# Patient Record
Sex: MALE | Race: ASIAN | NOT HISPANIC OR LATINO | URBAN - METROPOLITAN AREA
[De-identification: names, ages, dates, MRNs, and addresses within clinical notes are randomized per-mention and may not be internally consistent; named-entity substitution may affect disease eponyms.]

---

## 2021-06-08 ENCOUNTER — EMERGENCY (EMERGENCY)
Facility: HOSPITAL | Age: 26
LOS: 0 days | Discharge: HOME | End: 2021-06-09
Attending: STUDENT IN AN ORGANIZED HEALTH CARE EDUCATION/TRAINING PROGRAM
Payer: COMMERCIAL

## 2021-06-08 VITALS
DIASTOLIC BLOOD PRESSURE: 61 MMHG | WEIGHT: 179.9 LBS | OXYGEN SATURATION: 95 % | HEART RATE: 97 BPM | HEIGHT: 69 IN | RESPIRATION RATE: 16 BRPM | TEMPERATURE: 98 F | SYSTOLIC BLOOD PRESSURE: 101 MMHG

## 2021-06-08 DIAGNOSIS — L50.9 URTICARIA, UNSPECIFIED: ICD-10-CM

## 2021-06-08 DIAGNOSIS — T78.40XA ALLERGY, UNSPECIFIED, INITIAL ENCOUNTER: ICD-10-CM

## 2021-06-08 PROCEDURE — 99284 EMERGENCY DEPT VISIT MOD MDM: CPT

## 2021-06-08 RX ORDER — FAMOTIDINE 10 MG/ML
20 INJECTION INTRAVENOUS ONCE
Refills: 0 | Status: COMPLETED | OUTPATIENT
Start: 2021-06-08 | End: 2021-06-08

## 2021-06-08 RX ORDER — EPINEPHRINE 0.3 MG/.3ML
0.3 INJECTION INTRAMUSCULAR; SUBCUTANEOUS
Qty: 3 | Refills: 0
Start: 2021-06-08 | End: 2021-06-10

## 2021-06-08 RX ORDER — SODIUM CHLORIDE 9 MG/ML
1000 INJECTION INTRAMUSCULAR; INTRAVENOUS; SUBCUTANEOUS ONCE
Refills: 0 | Status: COMPLETED | OUTPATIENT
Start: 2021-06-08 | End: 2021-06-08

## 2021-06-08 RX ORDER — FAMOTIDINE 10 MG/ML
1 INJECTION INTRAVENOUS
Qty: 5 | Refills: 0
Start: 2021-06-08 | End: 2021-06-12

## 2021-06-08 RX ADMIN — Medication 125 MILLIGRAM(S): at 23:32

## 2021-06-08 RX ADMIN — FAMOTIDINE 20 MILLIGRAM(S): 10 INJECTION INTRAVENOUS at 23:32

## 2021-06-08 RX ADMIN — SODIUM CHLORIDE 1000 MILLILITER(S): 9 INJECTION INTRAMUSCULAR; INTRAVENOUS; SUBCUTANEOUS at 23:32

## 2021-06-08 NOTE — ED PROVIDER NOTE - CLINICAL SUMMARY MEDICAL DECISION MAKING FREE TEXT BOX
Pt p/w cutaneous hives/erythema. No evidence of multiorgan involvement. Given history and exam, presentation most consistent with allergic reaction. I have low suspicion for toxic shock syndrome, anaphylaxis, asthma exacerbation, or drug toxicity.  PLAN: Treat w/ IVF, steroids, diphenhydramine prn itching, famotidine.  Rx: Prednisone 40mg qday x3days, Benadryl 25mg q8hr x3 days, EpiPen.  Disposition: Discharge home. Follow up with PCP in 1-2 days. Follow up allergist in 2-5 days.

## 2021-06-08 NOTE — ED ADULT TRIAGE NOTE - CHIEF COMPLAINT QUOTE
BIBA,  allergic reaction to SoyMilk. hives.  denies difficulty breathing/swallowing.  pt took 25mg po benadryl,   EMS gave 50mg benadryl IV.  #20guage L-AC

## 2021-06-08 NOTE — ED PROVIDER NOTE - NSFOLLOWUPCLINICS_GEN_ALL_ED_FT
[facility] Immun[facility]  Allergy/Immunology  [address]  [place] [address]  Phone: [phone]  Fax:     [facility]  Allergy  [address] [place] [id number]  Phone: [phone]  Fax:     [facility]- [facility]  Allergy  [address] [facility] [address]  [place] [address]  Phone: [phone]  Fax: [phone]

## 2021-06-08 NOTE — ED PROVIDER NOTE - NSFOLLOWUPINSTRUCTIONS_ED_ALL_ED_FT
You have been evaluated in the emergency department today for an allergic reaction. You have been given medications to control your symptoms. You have been observed for several hours in the emergency department and you are stable for discharge at this time.     Please take benadryl and pepcid which are available over the counter to help control your symptoms at home.    You have also been given a prescription for steroids, please take them as directed.    Please schedule an appointment with your primary care doctor for follow up in 1-2 days.    Return to the emergency room if you experience rashes, difficulty breathing, difficulty swallowing, lip/mouth/tongue swelling, vomiting, abdominal pain, weakness, confusion, or for any other concerning symptoms.    Thank you for choosing us for your care.

## 2021-06-08 NOTE — ED PROVIDER NOTE - OBJECTIVE STATEMENT
26M no pmhx presenting with hives today after eating soy. No known allergies. Denies any neck pain, sore throat, difficulty breathing, chest pain, shortness of breath, coughs, abdominal pain, nausea/vomiting, subjective neurological deficits, weakness, syncope.

## 2021-06-08 NOTE — ED PROVIDER NOTE - PROGRESS NOTE DETAILS
no stridor, no respiratory distress, no chest pain, no lip or tongue swelling. (+) hives improved.    I have discussed with the patient about the ED workup, lab results, diagnostics results, plan for discharge home, need for follow-up with primary care physician/specialists, and return precautions. At this time, the patient does not require further workup in the ED. The patient is subjectively feeling better and would like to be discharged home. The patient had the opportunity to ask questions and I have answered all inquiries. The patient verbalizes understanding and agreement with the plan. The patient is hemodynamically stable, clinically well-appearing, ambulatory, mentating well and ready for discharge home.

## 2021-06-08 NOTE — ED PROVIDER NOTE - PATIENT PORTAL LINK FT
You can access the FollowMyHealth Patient Portal offered by [facility] by registering at the following website: [email or web address]. By joining [facility]’s FollowMyHealth portal, you will also be able to view your health information using other applications (apps) compatible with our system.

## 2021-06-08 NOTE — ED PROVIDER NOTE - PHYSICAL EXAMINATION
VITAL SIGNS: I have reviewed nursing notes and confirm.   GEN: Well-developed; well-nourished; in no acute distress. Speaking full sentences.  SKIN: Warm, pink, no diaphoresis, no cyanosis, well perfused. (+) hives throughout  HEAD: Normocephalic; atraumatic. No scalp lacerations, no abrasions.  NECK: Supple; non tender. no stridor  EYES: Pupils 3mm equal, round, reactive to light and accomodation, conjunctiva and sclera clear. Extra-ocular movements intact bilaterally.  ENT: No nasal discharge; airway clear. Trachea is midline. ORAL: No oropharyngeal exudates or erythema. Normal dentition.  CV: Regular rate and rhythm. S1, S2 normal; no murmurs, gallops, or rubs. No lower extremity pitting edema bilaterally. Capillary refill < 2 seconds throughout. Distal pulses intact 2+ throughout.  RESP: CTA bilaterally. No wheezes, rales, or rhonchi.   ABD: Normal bowel sounds, soft, non-distended, non-tender, no hepatosplenomegaly. No CVA tenderness bilaterally.  MSK: Normal range of motion and movement of all 4 extremities. No joint or muscular pain throughout. No clubbing.   BACK: No thoracolumbar midline or paravertebral tenderness. No step-offs or obvious deformities.   NEURO: Alert & oriented x 3, Grossly unremarkable. Sensory and motor intact throughout. No focal deficits. Gait: Fluid. Normal speech and coordination.   PSYCH: Cooperative, appropriate.

## 2021-06-09 VITALS
DIASTOLIC BLOOD PRESSURE: 68 MMHG | TEMPERATURE: 98 F | HEART RATE: 94 BPM | SYSTOLIC BLOOD PRESSURE: 112 MMHG | RESPIRATION RATE: 18 BRPM | OXYGEN SATURATION: 96 %

## 2021-06-09 NOTE — ED ADULT NURSE NOTE - OBJECTIVE STATEMENT
BIBA allergic reaction to SoyMilk. Patient c/o hives. No SOB or swallowing. Patient took 25mg po benadryl,   EMS gave 50mg benadryl IV.  #20guage L-AC

## 2022-07-27 NOTE — ED ADULT TRIAGE NOTE - NS ED NURSE AMBULANCES
Tremfya Monitoring Guidelines: A yearly test for tuberculosis is required while taking Tremfya. Is Phototherapy Contraindicated?: No Tremfya Dosing: 100 mg SC week 0 and week 4 then every 8 weeks thereafter Initial Cbc (Optional): pending Diagnosis (Required): Psoriasis Detail Level: Zone Pregnancy And Lactation Warning Text: The risk during pregnancy and breastfeeding is uncertain with this medication. [facility]